# Patient Record
Sex: MALE | Race: BLACK OR AFRICAN AMERICAN | Employment: FULL TIME | URBAN - METROPOLITAN AREA
[De-identification: names, ages, dates, MRNs, and addresses within clinical notes are randomized per-mention and may not be internally consistent; named-entity substitution may affect disease eponyms.]

---

## 2017-02-16 ENCOUNTER — OFFICE VISIT (OUTPATIENT)
Dept: ORTHOPEDIC SURGERY | Age: 62
End: 2017-02-16

## 2017-02-16 DIAGNOSIS — M17.10 ARTHRITIS OF KNEE: Primary | ICD-10-CM

## 2017-02-16 DIAGNOSIS — M17.11 PRIMARY OSTEOARTHRITIS OF RIGHT KNEE: ICD-10-CM

## 2017-02-16 PROCEDURE — 99213 OFFICE O/P EST LOW 20 MIN: CPT | Performed by: ORTHOPAEDIC SURGERY

## 2017-02-16 PROCEDURE — 20610 DRAIN/INJ JOINT/BURSA W/O US: CPT | Performed by: ORTHOPAEDIC SURGERY

## 2017-06-08 ENCOUNTER — OFFICE VISIT (OUTPATIENT)
Dept: ORTHOPEDIC SURGERY | Age: 62
End: 2017-06-08

## 2017-06-08 VITALS — HEIGHT: 73 IN | BODY MASS INDEX: 37.51 KG/M2 | WEIGHT: 283 LBS

## 2017-06-08 DIAGNOSIS — M17.11 PRIMARY OSTEOARTHRITIS OF RIGHT KNEE: Primary | ICD-10-CM

## 2017-06-08 PROCEDURE — 99213 OFFICE O/P EST LOW 20 MIN: CPT | Performed by: ORTHOPAEDIC SURGERY

## 2017-06-08 RX ORDER — MELOXICAM 15 MG/1
15 TABLET ORAL DAILY
Qty: 30 TABLET | Refills: 3 | Status: SHIPPED | OUTPATIENT
Start: 2017-06-08

## 2017-06-19 ENCOUNTER — OFFICE VISIT (OUTPATIENT)
Dept: ORTHOPEDIC SURGERY | Age: 62
End: 2017-06-19

## 2017-06-19 VITALS — WEIGHT: 283 LBS | BODY MASS INDEX: 37.51 KG/M2 | HEIGHT: 73 IN

## 2017-06-19 DIAGNOSIS — M19.019 ACROMIOCLAVICULAR JOINT ARTHRITIS: ICD-10-CM

## 2017-06-19 DIAGNOSIS — M75.42 SUBACROMIAL IMPINGEMENT, LEFT: Primary | ICD-10-CM

## 2017-06-19 PROBLEM — M75.40 SUBACROMIAL IMPINGEMENT: Status: ACTIVE | Noted: 2017-06-19

## 2017-06-19 PROCEDURE — 73030 X-RAY EXAM OF SHOULDER: CPT | Performed by: ORTHOPAEDIC SURGERY

## 2017-06-19 PROCEDURE — 99214 OFFICE O/P EST MOD 30 MIN: CPT | Performed by: ORTHOPAEDIC SURGERY

## 2017-06-19 PROCEDURE — 20610 DRAIN/INJ JOINT/BURSA W/O US: CPT | Performed by: ORTHOPAEDIC SURGERY

## 2017-06-26 ENCOUNTER — OFFICE VISIT (OUTPATIENT)
Dept: ORTHOPEDIC SURGERY | Age: 62
End: 2017-06-26

## 2017-06-26 VITALS — HEIGHT: 73 IN | BODY MASS INDEX: 37.51 KG/M2 | WEIGHT: 283 LBS

## 2017-06-26 DIAGNOSIS — M75.42 SUBACROMIAL IMPINGEMENT, LEFT: Primary | ICD-10-CM

## 2017-06-26 DIAGNOSIS — M19.019 ACROMIOCLAVICULAR JOINT ARTHRITIS: ICD-10-CM

## 2017-06-26 PROCEDURE — 99214 OFFICE O/P EST MOD 30 MIN: CPT | Performed by: ORTHOPAEDIC SURGERY

## 2017-07-10 DIAGNOSIS — M75.42 SUBACROMIAL IMPINGEMENT, LEFT: Primary | ICD-10-CM

## 2017-07-10 DIAGNOSIS — M19.019 ACROMIOCLAVICULAR JOINT ARTHRITIS: ICD-10-CM

## 2017-07-10 RX ORDER — OXYCODONE HYDROCHLORIDE 10 MG/1
10 TABLET ORAL EVERY 8 HOURS PRN
Qty: 40 TABLET | Refills: 0 | Status: SHIPPED | OUTPATIENT
Start: 2017-07-10 | End: 2017-07-10 | Stop reason: CLARIF

## 2017-07-10 RX ORDER — OXYCODONE HYDROCHLORIDE 10 MG/1
10 TABLET ORAL EVERY 8 HOURS PRN
Qty: 40 TABLET | Refills: 0 | Status: SHIPPED | OUTPATIENT
Start: 2017-07-10 | End: 2017-07-17

## 2017-07-10 RX ORDER — MELOXICAM 15 MG/1
15 TABLET ORAL DAILY
Qty: 30 TABLET | Refills: 3 | Status: SHIPPED | OUTPATIENT
Start: 2017-07-10

## 2017-07-14 ENCOUNTER — HOSPITAL ENCOUNTER (OUTPATIENT)
Dept: SURGERY | Age: 62
Discharge: HOME | End: 2017-07-14
Attending: ORTHOPAEDIC SURGERY | Admitting: ORTHOPAEDIC SURGERY

## 2017-07-14 VITALS
HEIGHT: 73 IN | WEIGHT: 285.5 LBS | BODY MASS INDEX: 37.84 KG/M2 | RESPIRATION RATE: 17 BRPM | HEART RATE: 68 BPM | TEMPERATURE: 97.2 F | OXYGEN SATURATION: 94 % | SYSTOLIC BLOOD PRESSURE: 152 MMHG | DIASTOLIC BLOOD PRESSURE: 75 MMHG

## 2017-07-14 LAB
ANION GAP SERPL CALCULATED.3IONS-SCNC: 11 MMOL/L (ref 3–16)
BUN BLDV-MCNC: 12 MG/DL (ref 7–20)
CALCIUM SERPL-MCNC: 9.2 MG/DL (ref 8.3–10.6)
CHLORIDE BLD-SCNC: 108 MMOL/L (ref 99–110)
CO2: 27 MMOL/L (ref 21–32)
CREAT SERPL-MCNC: 1.1 MG/DL (ref 0.8–1.3)
GFR AFRICAN AMERICAN: >60
GFR NON-AFRICAN AMERICAN: >60
GLUCOSE BLD-MCNC: 130 MG/DL (ref 70–99)
GLUCOSE BLD-MCNC: 99 MG/DL (ref 70–99)
HCT VFR BLD CALC: 44 % (ref 40.5–52.5)
HEMOGLOBIN: 14.4 G/DL (ref 13.5–17.5)
MCH RBC QN AUTO: 27.2 PG (ref 26–34)
MCHC RBC AUTO-ENTMCNC: 32.8 G/DL (ref 31–36)
MCV RBC AUTO: 83 FL (ref 80–100)
PDW BLD-RTO: 15.3 % (ref 12.4–15.4)
PERFORMED ON: ABNORMAL
PLATELET # BLD: 167 K/UL (ref 135–450)
PMV BLD AUTO: 9.6 FL (ref 5–10.5)
POTASSIUM SERPL-SCNC: 3.7 MMOL/L (ref 3.5–5.1)
RBC # BLD: 5.3 M/UL (ref 4.2–5.9)
SODIUM BLD-SCNC: 146 MMOL/L (ref 136–145)
WBC # BLD: 4.7 K/UL (ref 4–11)

## 2017-07-14 RX ORDER — ONDANSETRON 2 MG/ML
4 INJECTION INTRAMUSCULAR; INTRAVENOUS
Status: ACTIVE | OUTPATIENT
Start: 2017-07-14 | End: 2017-07-14

## 2017-07-14 RX ORDER — MEPERIDINE HYDROCHLORIDE 25 MG/ML
12.5 INJECTION INTRAMUSCULAR; INTRAVENOUS; SUBCUTANEOUS EVERY 5 MIN PRN
Status: DISCONTINUED | OUTPATIENT
Start: 2017-07-14 | End: 2017-07-15 | Stop reason: HOSPADM

## 2017-07-14 RX ORDER — MORPHINE SULFATE 2 MG/ML
1 INJECTION, SOLUTION INTRAMUSCULAR; INTRAVENOUS EVERY 5 MIN PRN
Status: DISCONTINUED | OUTPATIENT
Start: 2017-07-14 | End: 2017-07-15 | Stop reason: HOSPADM

## 2017-07-14 RX ORDER — ACETAMINOPHEN 325 MG/1
650 TABLET ORAL EVERY 4 HOURS PRN
Status: DISCONTINUED | OUTPATIENT
Start: 2017-07-14 | End: 2017-07-15 | Stop reason: HOSPADM

## 2017-07-14 RX ORDER — OXYCODONE HYDROCHLORIDE AND ACETAMINOPHEN 5; 325 MG/1; MG/1
2 TABLET ORAL PRN
Status: COMPLETED | OUTPATIENT
Start: 2017-07-14 | End: 2017-07-14

## 2017-07-14 RX ORDER — LABETALOL HYDROCHLORIDE 5 MG/ML
5 INJECTION, SOLUTION INTRAVENOUS EVERY 10 MIN PRN
Status: DISCONTINUED | OUTPATIENT
Start: 2017-07-14 | End: 2017-07-15 | Stop reason: HOSPADM

## 2017-07-14 RX ORDER — OXYCODONE HYDROCHLORIDE AND ACETAMINOPHEN 5; 325 MG/1; MG/1
TABLET ORAL
Status: DISCONTINUED
Start: 2017-07-14 | End: 2017-07-15 | Stop reason: HOSPADM

## 2017-07-14 RX ORDER — DIPHENHYDRAMINE HYDROCHLORIDE 50 MG/ML
12.5 INJECTION INTRAMUSCULAR; INTRAVENOUS
Status: ACTIVE | OUTPATIENT
Start: 2017-07-14 | End: 2017-07-14

## 2017-07-14 RX ORDER — FENTANYL CITRATE 50 UG/ML
25 INJECTION, SOLUTION INTRAMUSCULAR; INTRAVENOUS EVERY 5 MIN PRN
Status: DISCONTINUED | OUTPATIENT
Start: 2017-07-14 | End: 2017-07-15 | Stop reason: HOSPADM

## 2017-07-14 RX ORDER — SODIUM CHLORIDE 9 MG/ML
INJECTION, SOLUTION INTRAVENOUS CONTINUOUS
Status: DISCONTINUED | OUTPATIENT
Start: 2017-07-14 | End: 2017-07-15 | Stop reason: HOSPADM

## 2017-07-14 RX ORDER — OXYCODONE HYDROCHLORIDE AND ACETAMINOPHEN 5; 325 MG/1; MG/1
1 TABLET ORAL PRN
Status: COMPLETED | OUTPATIENT
Start: 2017-07-14 | End: 2017-07-14

## 2017-07-14 RX ORDER — HYDROMORPHONE HCL 110MG/55ML
0.5 PATIENT CONTROLLED ANALGESIA SYRINGE INTRAVENOUS EVERY 5 MIN PRN
Status: DISCONTINUED | OUTPATIENT
Start: 2017-07-14 | End: 2017-07-15 | Stop reason: HOSPADM

## 2017-07-14 RX ADMIN — OXYCODONE HYDROCHLORIDE AND ACETAMINOPHEN 1 TABLET: 5; 325 TABLET ORAL at 16:24

## 2017-07-14 RX ADMIN — SODIUM CHLORIDE: 9 INJECTION, SOLUTION INTRAVENOUS at 13:17

## 2017-07-14 ASSESSMENT — PAIN SCALES - GENERAL
PAINLEVEL_OUTOF10: 6
PAINLEVEL_OUTOF10: 4
PAINLEVEL_OUTOF10: 5

## 2017-07-14 ASSESSMENT — PAIN - FUNCTIONAL ASSESSMENT: PAIN_FUNCTIONAL_ASSESSMENT: 0-10

## 2017-07-19 ENCOUNTER — TELEPHONE (OUTPATIENT)
Dept: ORTHOPEDIC SURGERY | Age: 62
End: 2017-07-19

## 2017-07-20 ENCOUNTER — OFFICE VISIT (OUTPATIENT)
Dept: ORTHOPEDIC SURGERY | Age: 62
End: 2017-07-20

## 2017-07-20 DIAGNOSIS — M25.512 ACUTE PAIN OF LEFT SHOULDER: Primary | ICD-10-CM

## 2017-07-20 PROCEDURE — 99024 POSTOP FOLLOW-UP VISIT: CPT | Performed by: ORTHOPAEDIC SURGERY

## 2017-07-24 ENCOUNTER — HOSPITAL ENCOUNTER (OUTPATIENT)
Dept: PHYSICAL THERAPY | Age: 62
Discharge: HOME | End: 2017-07-31
Admitting: ORTHOPAEDIC SURGERY

## 2017-07-31 ENCOUNTER — HOSPITAL ENCOUNTER (OUTPATIENT)
Dept: PHYSICAL THERAPY | Age: 62
Discharge: HOME | End: 2017-07-31
Admitting: ORTHOPAEDIC SURGERY

## 2017-08-08 ENCOUNTER — HOSPITAL ENCOUNTER (OUTPATIENT)
Dept: PHYSICAL THERAPY | Age: 62
Discharge: HOME | End: 2017-08-08
Admitting: ORTHOPAEDIC SURGERY

## 2017-08-10 DIAGNOSIS — M17.11 PRIMARY OSTEOARTHRITIS OF RIGHT KNEE: Primary | ICD-10-CM

## 2017-08-15 ENCOUNTER — HOSPITAL ENCOUNTER (OUTPATIENT)
Dept: PHYSICAL THERAPY | Age: 62
Discharge: HOME | End: 2017-08-15
Admitting: ORTHOPAEDIC SURGERY

## 2017-08-15 DIAGNOSIS — R52 PAIN: Primary | ICD-10-CM

## 2017-08-15 PROCEDURE — MISC903 COLD PAC CERVICAL: Performed by: ORTHOPAEDIC SURGERY

## 2017-08-16 ENCOUNTER — TELEPHONE (OUTPATIENT)
Dept: ORTHOPEDIC SURGERY | Age: 62
End: 2017-08-16

## 2017-08-17 ENCOUNTER — OFFICE VISIT (OUTPATIENT)
Dept: ORTHOPEDIC SURGERY | Age: 62
End: 2017-08-17

## 2017-08-17 DIAGNOSIS — M17.11 PRIMARY OSTEOARTHRITIS OF RIGHT KNEE: Primary | ICD-10-CM

## 2017-08-17 PROCEDURE — 99213 OFFICE O/P EST LOW 20 MIN: CPT | Performed by: ORTHOPAEDIC SURGERY

## 2017-08-17 PROCEDURE — 20610 DRAIN/INJ JOINT/BURSA W/O US: CPT | Performed by: ORTHOPAEDIC SURGERY

## 2017-08-22 ENCOUNTER — HOSPITAL ENCOUNTER (OUTPATIENT)
Dept: PHYSICAL THERAPY | Age: 62
Discharge: HOME | End: 2017-08-22
Admitting: ORTHOPAEDIC SURGERY

## 2017-08-29 ENCOUNTER — HOSPITAL ENCOUNTER (OUTPATIENT)
Dept: PHYSICAL THERAPY | Age: 62
Discharge: HOME | End: 2017-08-29
Admitting: ORTHOPAEDIC SURGERY

## 2017-09-07 ENCOUNTER — OFFICE VISIT (OUTPATIENT)
Dept: ORTHOPEDIC SURGERY | Age: 62
End: 2017-09-07

## 2017-09-07 ENCOUNTER — HOSPITAL ENCOUNTER (OUTPATIENT)
Dept: PHYSICAL THERAPY | Age: 62
Discharge: HOME | End: 2017-09-07
Admitting: ORTHOPAEDIC SURGERY

## 2017-09-07 DIAGNOSIS — M75.42 SUBACROMIAL IMPINGEMENT, LEFT: Primary | ICD-10-CM

## 2017-09-07 DIAGNOSIS — M19.019 ACROMIOCLAVICULAR JOINT ARTHRITIS: ICD-10-CM

## 2017-09-07 PROCEDURE — 99024 POSTOP FOLLOW-UP VISIT: CPT | Performed by: ORTHOPAEDIC SURGERY

## 2017-09-12 ENCOUNTER — HOSPITAL ENCOUNTER (OUTPATIENT)
Dept: PHYSICAL THERAPY | Age: 62
Discharge: HOME | End: 2017-09-12
Admitting: ORTHOPAEDIC SURGERY

## 2017-09-12 ENCOUNTER — TELEPHONE (OUTPATIENT)
Dept: ORTHOPEDIC SURGERY | Age: 62
End: 2017-09-12

## 2017-09-14 ENCOUNTER — TELEPHONE (OUTPATIENT)
Dept: ORTHOPEDIC SURGERY | Age: 62
End: 2017-09-14

## 2017-09-19 ENCOUNTER — HOSPITAL ENCOUNTER (OUTPATIENT)
Dept: PHYSICAL THERAPY | Age: 62
Discharge: HOME | End: 2017-09-19
Admitting: ORTHOPAEDIC SURGERY

## 2017-09-26 ENCOUNTER — HOSPITAL ENCOUNTER (OUTPATIENT)
Dept: PHYSICAL THERAPY | Age: 62
Discharge: HOME | End: 2017-09-26
Admitting: ORTHOPAEDIC SURGERY

## 2017-10-03 ENCOUNTER — HOSPITAL ENCOUNTER (OUTPATIENT)
Dept: PHYSICAL THERAPY | Age: 62
Discharge: HOME | End: 2017-10-03
Admitting: ORTHOPAEDIC SURGERY

## 2017-10-03 NOTE — FLOWSHEET NOTE
[address], [facility]    Physical Therapy Daily Treatment Note  Date:  10/3/2017    Patient Name:  Elisa Dugan    :  1955  MRN: [id number]  Restrictions/Precautions:    Medical/Treatment Diagnosis Information:  · Diagnosis: M25.512 (Left shoulder pain)   · Treatment Diagnosis: M25.512 (Left shoulder pain)   Insurance/Certification information:  PT Insurance Information:   Physician Information:  Referring Practitioner: Dr. Dudley Acuña of care signed (Y/N):     Date of Patient follow up with Physician:  Emelyn Glez 10/12    G-Code (if applicable):      Date G-Code Applied:         Progress Note: [x]  Yes  []  No  Next due by: Visit #10      Latex Allergy:  [x]NO      []YES  Preferred Language for Healthcare:   [x]English       []other:    Visit # Insurance Allowable   11  10/3 20     Pain level:  2/10 at rest, 5/10 w/ IR towel stretch     SUBJECTIVE:  8 + weeks s/p  Sore from moving furniture yesterday, noticed some soreness after   OBJECTIVE:  Observation:  Test measurements:    17  PROM Flex  ~165  ABD ~170  17  Prom with increased stiffness with Abduction and IR    RESTRICTIONS/PRECAUTIONS: L SAD, DCE & debridement 17    Exercises/Interventions:   Therapeutic Ex  40' Wt / Resistance Sets/sec Reps Notes                  Pulley shoulder AAROM  5 mins  Cane AAROM flex/abduction/ER    [employer]            T- band Row/pinch Blue   x30    T-band high row blue   x30    T-band extension  blue  x30    T-band IR/ER Green  x30    T- band lower pinch       T- band ER activation       Supine SA punch 6#  x30    SL ER  SL ABD  5#  3# 3  2 10    Prone Rows/EXT  HABD 6#  6# 3  3 10    Wall Slides Seated HH  Depression       No Money  green 1 25    Standing flex/scap   NPV    Scapular ball rolls IR towel    Sleeper IR   30\" 3xea       LLLD ER hammock/ Doorway 30\" 3x HEP @ home                  Forward diagonal stretch  10 sec x3 HEP   Upper trap stretch  10 sec x3 HEP     Manual Intervention  8'       Shld /GH Mobs  8 min     Post Cap mobs       Thoracic/Rib manipualtion       CT MT/Mobs       PROM MT                     NMR re-education 8'       T-spine Ext       GH depres/compress       Liz Scap [employer] stick  3#Flex  3x 15\"    Body blade ER/IR & flex Black  3x 30\"  Flex yellow   Supine flex/ 90/90 ER/IR RED 2  10ea     Wall Ball bounce       D2 FLEX YELLOW 2 10    Wall pushup   x30        Therapeutic Exercise and NMR EXR  [x] (85714) Provided verbal/tactile cueing for activities related to strengthening, flexibility, endurance, ROM  for improvements in scapular, scapulothoracic and UE control with self care, reaching, carrying, lifting, house/yardwork, driving/computer work.    [] (75727) Provided verbal/tactile cueing for activities related to improving balance, coordination, kinesthetic sense, posture, motor skill, proprioception  to assist with  scapular, scapulothoracic and UE control with self care, reaching, carrying, lifting, house/yardwork, driving/computer work. Therapeutic Activities:    [] (10883 or 79747) Provided verbal/tactile cueing for activities related to improving balance, coordination, kinesthetic sense, posture, motor skill, proprioception and motor activation to allow for proper function of scapular, scapulothoracic and UE control with self care, carrying, lifting, driving/computer work.      Home Exercise Program:    [x] (71867) Reviewed/Progressed HEP activities related to strengthening, flexibility, endurance, ROM of scapular, scapulothoracic and UE control with self care, reaching, carrying, lifting, house/yardwork, driving/computer work  [] (58580) Reviewed/Progressed HEP activities related to improving balance, coordination, kinesthetic sense, posture, motor skill, proprioception of scapular, scapulothoracic and UE control with self care, reaching, carrying, lifting, house/yardwork, driving/computer work pain  [] Progression has been slowed due to co-morbidities. [] Plan just implemented, too soon to assess goals progression  [] Other:     ASSESSMENT:    Pt continues to improve RTC/UE strength with program exercises. Pt has a tendency to overuse his upper traps due to RTC weakness, but found less compensation today. Treatment/Activity Tolerance:  [x] Patient tolerated treatment well [x] Patient limited by fatique  [] Patient limited by pain  [] Patient limited by other medical complications  [] Other:     Prognosis: [x] Good [] Fair  [] Poor    Patient Requires Follow-up: [x] Yes  [] No    PLAN:  Progress as able towards overhead strength, improve end range IR ROM with left shoulder. Progress endurance as able and focus on OH strengthening Millan 10/12.   [x] Continue per plan of care [] Alter current plan (see comments)  [] Plan of care initiated [] Hold pending MD visit [] Discharge    Electronically signed by: Sergio Martinez PTA, [id number]

## 2017-10-10 ENCOUNTER — HOSPITAL ENCOUNTER (OUTPATIENT)
Dept: PHYSICAL THERAPY | Age: 62
Discharge: HOME | End: 2017-10-10
Admitting: ORTHOPAEDIC SURGERY

## 2017-10-10 NOTE — FLOWSHEET NOTE
[address] , [facility]    Physical Therapy Daily Treatment Note  Date:  10/10/2017    Patient Name:  Leti Ramsey    :  1955  MRN: [id number]  Restrictions/Precautions:    Medical/Treatment Diagnosis Information:  · Diagnosis: M25.512 (Left shoulder pain)   · Treatment Diagnosis: M25.512 (Left shoulder pain)   Insurance/Certification information:  PT Insurance Information:   Physician Information:  Referring Practitioner: Dr. Tejada Ee of care signed (Y/N):     Date of Patient follow up with Physician:  Dave Larsen 10/12    G-Code (if applicable):      Date G-Code Applied:         Progress Note: [x]  Yes  []  No  Next due by: Visit #10      Latex Allergy:  [x]NO      []YES  Preferred Language for Healthcare:   [x]English       []other:    Visit # Insurance Allowable   11  10/3 20     Pain level:  2/10 at rest, 5/10 w/ IR towel stretch     SUBJECTIVE:  8 + weeks s/p   Pt reports having an episode on  (while going down the stairs) where he had to grab the stairs case rapidly causing a dramatic increase in pain/ soreness since. Is trying to ice more and has been taking alleve. OBJECTIVE:  Observation:  Test measurements:    17  PROM Flex  ~165  ABD ~170  17  Prom with increased stiffness with Abduction and IR  10/10/17  TTP over the clavicle and slightly over the lateral shoulder, MMT ER demonstrated good RTC strength 4/5. PROM good ROM but had pain in all end ranges and especially attempting to perform IR ROM.      RESTRICTIONS/PRECAUTIONS: L SAD, DCE & debridement 17    Exercises/Interventions: program modified today due to increased shoulder pain  Therapeutic Ex  20' Wt / Resistance Sets/sec Reps Notes                  Pulley shoulder AAROM  5 mins  Cane AAROM flex/abduction/ER    [employer]            T- band Row/pinch  RED   x30    T-band high row  x30    T-band extension   x30    T-band IR/ER RED x30    T- band lower

## 2017-10-12 ENCOUNTER — OFFICE VISIT (OUTPATIENT)
Dept: ORTHOPEDIC SURGERY | Age: 62
End: 2017-10-12

## 2017-10-12 VITALS — WEIGHT: 285 LBS | HEIGHT: 73 IN | BODY MASS INDEX: 37.77 KG/M2

## 2017-10-12 DIAGNOSIS — M19.019 ACROMIOCLAVICULAR JOINT ARTHRITIS: Primary | ICD-10-CM

## 2017-10-12 DIAGNOSIS — M75.42 SUBACROMIAL IMPINGEMENT, LEFT: ICD-10-CM

## 2017-10-12 PROCEDURE — 99024 POSTOP FOLLOW-UP VISIT: CPT | Performed by: ORTHOPAEDIC SURGERY

## 2017-10-12 NOTE — LETTER
[facility]  [address] [facility] [address]  Phone: [phone]  Fax: [phone] [facility],[address] [place], DO        October 12, 2017     Patient: Shanice Shukla   YOB: 1955   Date of Visit: 10/12/2017       To Whom It May Concern: It is my medical opinion that Glendy Medina should remain out of work until 11/7/17. If you have any questions or concerns, please don't hesitate to call.     Sincerely,         Sri Xiong DO

## 2017-10-17 ENCOUNTER — HOSPITAL ENCOUNTER (OUTPATIENT)
Dept: PHYSICAL THERAPY | Age: 62
Discharge: HOME | End: 2017-10-17
Admitting: ORTHOPAEDIC SURGERY

## 2017-10-17 NOTE — FLOWSHEET NOTE
lifting, house/yardwork, driving/computer work  [] (83828) Reviewed/Progressed HEP activities related to improving balance, coordination, kinesthetic sense, posture, motor skill, proprioception of scapular, scapulothoracic and UE control with self care, reaching, carrying, lifting, house/yardwork, driving/computer work      Manual Treatments:  PROM / STM / Oscillations-Mobs:  G-I, II, III, IV (PA's, Inf., Post.)  [x] (61992) Provided manual therapy to mobilize soft tissue/joints of cervical/CT, scapular GHJ and UE for the purpose of modulating pain, promoting relaxation,  increasing ROM, reducing/eliminating soft tissue swelling/inflammation/restriction, improving soft tissue extensibility and allowing for proper ROM for normal function with self care, reaching, carrying, lifting, house/yardwork, driving/computer work    Modalities:  Ice/ ES  x15    Charges:  Timed Code Treatment Minutes: 40 mins   Total Treatment Minutes: 55  mins     [] EVAL  [x] UA(65470) x  2   [] IONTO  [] NMR (77376) x      [] VASO  [x] Manual (25051) x  1    [] Other:  [] TA x       [] Mech Traction (64284)  [] ES(attended) (95668)      [x] ES (un) (92527):     GOALS:  Patient stated goal: \"return to work\"     Therapist goals for Patient:   Short Term Goals: To be achieved in: 2 weeks  1. Independent in HEP and progression per patient tolerance, in order to prevent re-injury. 2. Patient will have a decrease in pain to facilitate improvement in movement, function, and ADLs as indicated by Functional Deficits. Long Term Goals: To be achieved in: 8 weeks  1. Disability index score of 20% or less for the DASH to assist with reaching prior level of function. 2. Patient will demonstrate increased left shoulder AROM to WNL to allow for proper joint functioning as indicated by patients Functional Deficits.    3. Patient will demonstrate an increase in Left shoulder strength to 5/5 to allow for proper functional mobility as indicated by patients

## 2017-10-18 ENCOUNTER — TELEPHONE (OUTPATIENT)
Dept: ORTHOPEDIC SURGERY | Age: 62
End: 2017-10-18

## 2017-10-18 NOTE — TELEPHONE ENCOUNTER
E-mailed a medical questionnaire from [facility] to [facility]) for completion.

## 2017-10-24 ENCOUNTER — HOSPITAL ENCOUNTER (OUTPATIENT)
Dept: PHYSICAL THERAPY | Age: 62
Discharge: HOME | End: 2017-10-24
Admitting: ORTHOPAEDIC SURGERY

## 2017-10-24 NOTE — FLOWSHEET NOTE
[address], [facility]    Physical Therapy Daily Treatment Note  Date:  10/24/2017    Patient Name:  Kolton Kirby    :  1955  MRN: [id number]  Restrictions/Precautions:    Medical/Treatment Diagnosis Information:  · Diagnosis: M25.512 (Left shoulder pain)   · Treatment Diagnosis: M25.512 (Left shoulder pain)   Insurance/Certification information:  PT Insurance Information:   Physician Information:  Referring Practitioner: Dr. Urvashi Roberts of care signed (Y/N):     Date of Patient follow up with Physician:  Janelle Martinez 10/12    G-Code (if applicable):      Date G-Code Applied:         Progress Note: [x]  Yes  []  No  Next due by: Visit #10      Latex Allergy:  [x]NO      []YES  Preferred Language for Healthcare:   [x]English       []other:    Visit # Insurance Allowable   11  10/17 20     Pain level:  4/10 at rest, 5/10 w/ IR towel stretch     SUBJECTIVE:  8 + weeks s/p   Shoulder feeling much better overall and is having less stiffness. Minimal pain over the weekend and was surprised not to have more discomfort related to the weather. OBJECTIVE:  Observation:  Test measurements:    17  PROM Flex  ~165  ABD ~170  17  Prom with increased stiffness with Abduction and IR  10/10/17  TTP over the clavicle and slightly over the lateral shoulder, MMT ER demonstrated good RTC strength 4/5. PROM good ROM but had pain in all end ranges and especially attempting to perform IR ROM.      RESTRICTIONS/PRECAUTIONS: L SAD, DCE & debridement 17    Exercises/Interventions:   Therapeutic Ex  30' Wt / Resistance Sets/sec Reps Notes                  Pulley shoulder AAROM  5 mins  Cane AAROM flex/abduction/ER    [employer]            T- band Row/pinch  RED   x30    T-band high row Green   x30    T-band extension  Green  x30    T-band IR  ER Blue  Green x30    T- band lower pinch       T- band ER activation       Supine SA punch 3#  x30    SL ER  SL ABD Reviewed/Progressed HEP activities related to improving balance, coordination, kinesthetic sense, posture, motor skill, proprioception of scapular, scapulothoracic and UE control with self care, reaching, carrying, lifting, house/yardwork, driving/computer work      Manual Treatments:  PROM / STM / Oscillations-Mobs:  G-I, II, III, IV (PA's, Inf., Post.)  [x] (86499) Provided manual therapy to mobilize soft tissue/joints of cervical/CT, scapular GHJ and UE for the purpose of modulating pain, promoting relaxation,  increasing ROM, reducing/eliminating soft tissue swelling/inflammation/restriction, improving soft tissue extensibility and allowing for proper ROM for normal function with self care, reaching, carrying, lifting, house/yardwork, driving/computer work    Modalities:  Ice/ ES  x15    Charges:  Timed Code Treatment Minutes: 40 mins   Total Treatment Minutes: 55  mins     [] EVAL  [x] DN(46049) x  2   [] IONTO  [] NMR (09545) x      [] VASO  [x] Manual (21633) x  1    [] Other:  [] TA x       [] Mech Traction (30782)  [] ES(attended) (44836)      [x] ES (un) (60261):     GOALS:  Patient stated goal: \"return to work\"     Therapist goals for Patient:   Short Term Goals: To be achieved in: 2 weeks  1. Independent in HEP and progression per patient tolerance, in order to prevent re-injury. 2. Patient will have a decrease in pain to facilitate improvement in movement, function, and ADLs as indicated by Functional Deficits. Long Term Goals: To be achieved in: 8 weeks  1. Disability index score of 20% or less for the DASH to assist with reaching prior level of function. 2. Patient will demonstrate increased left shoulder AROM to WNL to allow for proper joint functioning as indicated by patients Functional Deficits. 3. Patient will demonstrate an increase in Left shoulder strength to 5/5 to allow for proper functional mobility as indicated by patients Functional Deficits.    4. Patient will return to all

## 2017-10-31 ENCOUNTER — HOSPITAL ENCOUNTER (OUTPATIENT)
Dept: PHYSICAL THERAPY | Age: 62
Discharge: HOME | End: 2017-10-31
Admitting: ORTHOPAEDIC SURGERY

## 2017-10-31 NOTE — FLOWSHEET NOTE
[address], [facility]    Physical Therapy Daily Treatment Note  Date:  10/31/2017    Patient Name:  Mario Naranjo    :  1955  MRN: [id number]  Restrictions/Precautions:    Medical/Treatment Diagnosis Information:  · Diagnosis: M25.512 (Left shoulder pain)   · Treatment Diagnosis: M25.512 (Left shoulder pain)   Insurance/Certification information:  PT Insurance Information:   Physician Information:  Referring Practitioner: Dr. Britney Man of care signed (Y/N):     Date of Patient follow up with Physician:  Nicole Bowie 10/12    G-Code (if applicable):      Date G-Code Applied:         Progress Note: [x]  Yes  []  No  Next due by: Visit #10      Latex Allergy:  [x]NO      []YES  Preferred Language for Healthcare:   [x]English       []other:    Visit # Insurance Allowable   11  10/17 20     Pain level:  4/10 at rest, 5/10 w/ IR towel stretch     SUBJECTIVE:  8 + weeks s/p    Shoulder feels pretty good today, denies any pain. OBJECTIVE:  Observation:  Test measurements:    17  PROM Flex  ~165  ABD ~170  17  Prom with increased stiffness with Abduction and IR  10/10/17  TTP over the clavicle and slightly over the lateral shoulder, MMT ER demonstrated good RTC strength 4/5. PROM good ROM but had pain in all end ranges and especially attempting to perform IR ROM.      RESTRICTIONS/PRECAUTIONS: L SAD, DCE & debridement 17    Exercises/Interventions:   Therapeutic Ex  35' Wt / Resistance Sets/sec Reps Notes                  Pulley shoulder AAROM  5 mins  Cane AAROM flex/abduction/ER    [employer] Rave press 5# 3 10            T- band Row/pinch  RED   x30    T-band high row Green   x30    T-band extension  Green  x30    T-band IR  ER Blue  Green x30    T- band lower pinch       T- band ER activation       Supine SA punch 4#  x30    SL ER  SL ABD  3#  3# 3   10    Prone Rows/EXT  HABD    4#  2# 3   10    Wall Slides Flex  10 10 Seated HH  Depression kinesthetic sense, posture, motor skill, proprioception of scapular, scapulothoracic and UE control with self care, reaching, carrying, lifting, house/yardwork, driving/computer work      Manual Treatments:  PROM / STM / Oscillations-Mobs:  G-I, II, III, IV (PA's, Inf., Post.)  [x] (01517) Provided manual therapy to mobilize soft tissue/joints of cervical/CT, scapular GHJ and UE for the purpose of modulating pain, promoting relaxation,  increasing ROM, reducing/eliminating soft tissue swelling/inflammation/restriction, improving soft tissue extensibility and allowing for proper ROM for normal function with self care, reaching, carrying, lifting, house/yardwork, driving/computer work    Modalities:  Ice/ ES  x15    Charges:  Timed Code Treatment Minutes: 53 mins   Total Treatment Minutes: 68  mins     [] EVAL  [x] BF(44269) x  2   [] IONTO  [x] NMR (79300) x  1   [] VASO  [x] Manual (12276) x  1    [] Other:  [] TA x       [] Mech Traction (77277)  [] ES(attended) (38373)      [] ES (un) (27871):     GOALS:  Patient stated goal: \"return to work\"     Therapist goals for Patient:   Short Term Goals: To be achieved in: 2 weeks  1. Independent in HEP and progression per patient tolerance, in order to prevent re-injury. 2. Patient will have a decrease in pain to facilitate improvement in movement, function, and ADLs as indicated by Functional Deficits. Long Term Goals: To be achieved in: 8 weeks  1. Disability index score of 20% or less for the DASH to assist with reaching prior level of function. 2. Patient will demonstrate increased left shoulder AROM to WNL to allow for proper joint functioning as indicated by patients Functional Deficits. 3. Patient will demonstrate an increase in Left shoulder strength to 5/5 to allow for proper functional mobility as indicated by patients Functional Deficits. 4. Patient will return to all functional/work related activities without increased symptoms or restriction.    5. Pt will lift >5lbs without difficulty. Progression Towards Functional goals:  [x] Patient is progressing as expected towards functional goals listed. [] Progression is slowed due to pain  [] Progression has been slowed due to co-morbidities. [] Plan just implemented, too soon to assess goals progression  [] Other:     ASSESSMENT:        Treatment/Activity Tolerance:  [x] Patient tolerated treatment well [x] Patient limited by fatique  [] Patient limited by pain  [] Patient limited by other medical complications  [x] Other: denied pain today with program content but fatigued     Prognosis: [x] Good [] Fair  [] Poor    Patient Requires Follow-up: [x] Yes  [] No    PLAN:  Continue to ice shoulder and monitor OH activities until shoulder symptoms decrease.   [x] Continue per plan of care [] Alter current plan (see comments)  [] Plan of care initiated [] Hold pending MD visit [] Discharge    Electronically signed by: Rubi Hutton [facility], [id number]

## 2017-11-01 ENCOUNTER — HOSPITAL ENCOUNTER (OUTPATIENT)
Dept: PHYSICAL THERAPY | Age: 62
Discharge: HOME | End: 2017-11-30
Attending: ORTHOPAEDIC SURGERY | Admitting: ORTHOPAEDIC SURGERY

## 2017-11-02 ENCOUNTER — OFFICE VISIT (OUTPATIENT)
Dept: ORTHOPEDIC SURGERY | Age: 62
End: 2017-11-02

## 2017-11-02 DIAGNOSIS — M75.42 SUBACROMIAL IMPINGEMENT, LEFT: ICD-10-CM

## 2017-11-02 DIAGNOSIS — M19.019 ACROMIOCLAVICULAR JOINT ARTHRITIS: Primary | ICD-10-CM

## 2017-11-02 PROCEDURE — 99213 OFFICE O/P EST LOW 20 MIN: CPT | Performed by: ORTHOPAEDIC SURGERY

## 2017-11-02 NOTE — LETTER
[facility]  [address] [address]  Phone: [phone]  Fax: [phone] [facility],[address] DO Shawna        November 2, 2017     Patient: Braxton Sotelo   YOB: 1955   Date of Visit: 11/2/2017       To Whom It May Concern: It is my medical opinion that Gary Santana should remain out of work until 01/01/2018. If you have any questions or concerns, please don't hesitate to call.     Sincerely,         Sharon Gama DO

## 2017-11-02 NOTE — PROGRESS NOTES
internal rotation against resistance external rotation against resistance supraspinatus isolation against resistance. Shoulder shrug strength is 5 over 5 equal bilaterally. Radial ulnar and median nerve function is intact. Capillary refill is brisk. Elbow motion finger and wrist motion is full equal bilaterally. Deep tendon reflexes of the Brachial radialis, biceps, tricepsAre all +2/4 equal bilaterally. Cervical spine range of motion is full without pain negative Spurling's test.  Load-and-shift test is negative. Crank test is negative. Apprehension and relocation is negative. Anterior and posterior glide are equal bilaterally. Negative sulcus sign. No signs of any significant multidirectional instability. There is no scapular winging. There is no muscle atrophy of the latissimus dorsi, the deltoid, the periscapular musculature,The trapezius musculature or the pectoralis musculature. Negative Neer's test, negative Cohn test, no pain with crossarm elevation. Abduction --150 degrees  Flexion-- 180 degrees  Extension-- between 45-60 degrees  Latera/external  rotation --close to 90 degrees  Medial/ internal rotation -- between 70-90 degree      Reflex: upper extremity: Deep tendon reflexes of the biceps, triceps, brachioradialis +2/4 equal bilaterally  Lower extremity: +2/4 and equal bilaterally for patella and Achilles    Additional Comments:       Cervical spine exam demonstrates no  Radiculopathy no reproduction of the symptomology. Range of motion is normal without pain or radiculopathy and does not cause shoulder pain. Additional Examinations:  Right Upper Extremity:  Examination of the right upper extremity does not show any tenderness, deformity or injury. Range of motion is unremarkable. There is no gross instability. There are no rashes, ulcerations or lesions.   Strength and tone are normal.  Thoracic Spine: Examination of the thoracic spine does not show any tenderness, deformity or injury. Range of motion is unremarkable. There is no gross instability. There are no rashes, ulcerations or lesions. Strength and tone are normal.  Neck: Examination of the neck does not show any tenderness, deformity or injury. Range of motion is unremarkable. There is no gross instability. There are no rashes, ulcerations or lesions. Strength and tone are normal.    Past Surgical History:   Procedure Laterality Date    CHOLECYSTECTOMY      HERNIA REPAIR      inguinal     KNEE ARTHROSCOPY Right 9/18/15    PARTIAL MEDIAL MENISCECTOMY,SYNOVECTOMY, CHONDROPLASTY    SHOULDER ARTHROSCOPY Left 07/14/2017    SUBACROMIAL DECOMPRESSION, DISTAL CLAVICLE EXCISION, LABRAL DEBRIDEMENT         Assessment:  Left shoulder postop subacromial decompression distal clavicle excision doing much better almost back to where he needs to be    Impression: Same    Office Procedures:  No orders of the defined types were placed in this encounter. Previous Treatments:  Shoulder arthroscopy subacromial decompression distal clavicle excision, physical therapy    Treatment Plan:  Continue work on strength and range of motion follow-up in 6 weeks at which point we should build to release him to all activities      [facility] [employer]  Board Certified  [employer] Team Physician

## 2017-11-08 ENCOUNTER — TELEPHONE (OUTPATIENT)
Dept: ORTHOPEDIC SURGERY | Age: 62
End: 2017-11-08

## 2017-11-08 ENCOUNTER — HOSPITAL ENCOUNTER (OUTPATIENT)
Dept: PHYSICAL THERAPY | Age: 62
Discharge: HOME | End: 2017-11-08
Admitting: ORTHOPAEDIC SURGERY

## 2017-11-08 NOTE — FLOWSHEET NOTE
[address], [facility]    Physical Therapy Daily Treatment Note  Date:  2017    Patient Name:  Tomasa Arcos    :  1955  MRN: [id number]  Restrictions/Precautions:    Medical/Treatment Diagnosis Information:  · Diagnosis: M25.512 (Left shoulder pain)   · Treatment Diagnosis: M25.512 (Left shoulder pain)   Insurance/Certification information:  PT Insurance Information:   Physician Information:  Referring Practitioner: Dr. Mia Ghosh of care signed (Y/N):     Date of Patient follow up with Physician:  Maranda Krabbe     G-Code (if applicable):      Date G-Code Applied:         Progress Note: [x]  Yes  []  No  Next due by: Visit #10      Latex Allergy:  [x]NO      []YES  Preferred Language for Healthcare:   [x]English       []other:    Visit # Insurance Allowable        Pain level:  4/10     SUBJECTIVE:  16 weeks s/p   Less collarbone soreness but has some discomfort. Millan pleased  With his progress but plans to keep him off work for another 6 weeks. OBJECTIVE:  Observation:  Test measurements:    17  PROM Flex  ~165  ABD ~170  17  Prom with increased stiffness with Abduction and IR  10/10/17  TTP over the clavicle and slightly over the lateral shoulder, MMT ER demonstrated good RTC strength 4/5. PROM good ROM but had pain in all end ranges and especially attempting to perform IR ROM.    17 end range stiffness with PROM abduction    RESTRICTIONS/PRECAUTIONS: L SAD, DCE & debridement 17    Exercises/Interventions:   Therapeutic Ex  35' Wt / Resistance Sets/sec Reps Notes                  Pulley shoulder AAROM  5 mins  Cane AAROM flex/abduction/ER    [employer] press 5# 3 10            T- band Row/pinch  RED   x30    T-band high row    T-band extension     T-band IR  ER   Green x30    T- band lower pinch       T- band ER activation       Supine SA punch 5#  x30    SL ER  SL ABD  3#  3# 3   10    Prone Rows/EXT  HABD 4#  3# 3   10    Wall Slides Seated HH  Depression       No Money Teal  1 25           Scapular ball rolls IR towel    Sleeper IR   30\" 3xea       LLLD ER hammock/ Doorway 3'               Forward diagonal stretch  HEP   Upper trap stretch  HEP     Manual Intervention  8'       Shld /GH Mobs  8 min     Post Cap mobs       Thoracic/Rib manipualtion       CT MT/Mobs       PROM MT                     NMR re-education  10'       Standing scaption  2# 2 10    GH depres/compress       Liz Scap Bio       Water stick  Resume NPV   Body blade ER/IR &   flex Black  yellow  3x 30\"  15\"  Flex yellow   Standing flex/ 90/90 ER/IR yellow 3  10ea   Resume NPV   Wall Ball bounce    D2 FLEX standing yellow  3 10    Wall pushup  x30        Therapeutic Exercise and NMR EXR  [x] (66231) Provided verbal/tactile cueing for activities related to strengthening, flexibility, endurance, ROM  for improvements in scapular, scapulothoracic and UE control with self care, reaching, carrying, lifting, house/yardwork, driving/computer work.    [] (87319) Provided verbal/tactile cueing for activities related to improving balance, coordination, kinesthetic sense, posture, motor skill, proprioception  to assist with  scapular, scapulothoracic and UE control with self care, reaching, carrying, lifting, house/yardwork, driving/computer work. Therapeutic Activities:    [] (90561 or 15552) Provided verbal/tactile cueing for activities related to improving balance, coordination, kinesthetic sense, posture, motor skill, proprioception and motor activation to allow for proper function of scapular, scapulothoracic and UE control with self care, carrying, lifting, driving/computer work.      Home Exercise Program:    [x] (16604) Reviewed/Progressed HEP activities related to strengthening, flexibility, endurance, ROM of scapular, scapulothoracic and UE control with self care, reaching, carrying, lifting, house/yardwork, driving/computer work  [] (92230) functional/work related activities without increased symptoms or restriction. 5. Pt will lift >5lbs without difficulty. Progression Towards Functional goals:  [x] Patient is progressing as expected towards functional goals listed. [] Progression is slowed due to pain  [] Progression has been slowed due to co-morbidities. [] Plan just implemented, too soon to assess goals progression  [] Other:     ASSESSMENT:        Treatment/Activity Tolerance:  [x] Patient tolerated treatment well [x] Patient limited by fatique  [] Patient limited by pain  [] Patient limited by other medical complications  [x] Other: denied pain today with program content but fatigued with OH progressions    Prognosis: [x] Good [] Fair  [] Poor    Patient Requires Follow-up: [x] Yes  [] No    PLAN:  Continue to ice shoulder and monitor activities around the house until stronger.  Progress strength as able  [x] Continue per plan of care [] Alter current plan (see comments)  [] Plan of care initiated [] Hold pending MD visit [] Discharge    Electronically signed by: Mauro Salas PTA, [id number]

## 2017-11-14 ENCOUNTER — HOSPITAL ENCOUNTER (OUTPATIENT)
Dept: PHYSICAL THERAPY | Age: 62
Discharge: HOME | End: 2017-11-14
Admitting: ORTHOPAEDIC SURGERY

## 2017-11-14 NOTE — FLOWSHEET NOTE
Rows/EXT  HABD    6# ROWS  3# 3   10    Wall Slides Seated HH  Depression       No Money Teal  1 25           Scapular ball rolls IR towel    Sleeper IR   30\" 3xea       LLLD ER hammock/ Doorway 3'               Forward diagonal stretch  HEP   Upper trap stretch  HEP     Manual Intervention  8'       Shld /GH Mobs  8 min     Post Cap mobs       Thoracic/Rib manipualtion       CT MT/Mobs       PROM MT                     NMR re-education  10'       Standing scaption  2# 2 10    GH depres/compress       Liz Scap Bio       Water stick  3# Flex  3x 15\"    Body blade ER/IR &   Flex  Black  Yellow        3x 30\"  3x 15\"  Flex yellow   Standing flex/ 90/90 ER/IR yellow 3  10ea      Wall Ball bounce    D2 FLEX standing yellow  3 10    Wall pushup  x30        Therapeutic Exercise and NMR EXR  [x] (69303) Provided verbal/tactile cueing for activities related to strengthening, flexibility, endurance, ROM  for improvements in scapular, scapulothoracic and UE control with self care, reaching, carrying, lifting, house/yardwork, driving/computer work.    [] (01252) Provided verbal/tactile cueing for activities related to improving balance, coordination, kinesthetic sense, posture, motor skill, proprioception  to assist with  scapular, scapulothoracic and UE control with self care, reaching, carrying, lifting, house/yardwork, driving/computer work. Therapeutic Activities:    [] (89728 or 27159) Provided verbal/tactile cueing for activities related to improving balance, coordination, kinesthetic sense, posture, motor skill, proprioception and motor activation to allow for proper function of scapular, scapulothoracic and UE control with self care, carrying, lifting, driving/computer work.      Home Exercise Program:    [x] (19117) Reviewed/Progressed HEP activities related to strengthening, flexibility, endurance, ROM of scapular, scapulothoracic and UE control with self care, reaching, carrying, lifting, house/yardwork, driving/computer work  [] (22179) Reviewed/Progressed HEP activities related to improving balance, coordination, kinesthetic sense, posture, motor skill, proprioception of scapular, scapulothoracic and UE control with self care, reaching, carrying, lifting, house/yardwork, driving/computer work      Manual Treatments:  PROM / STM / Oscillations-Mobs:  G-I, II, III, IV (PA's, Inf., Post.)  [x] (29628) Provided manual therapy to mobilize soft tissue/joints of cervical/CT, scapular GHJ and UE for the purpose of modulating pain, promoting relaxation,  increasing ROM, reducing/eliminating soft tissue swelling/inflammation/restriction, improving soft tissue extensibility and allowing for proper ROM for normal function with self care, reaching, carrying, lifting, house/yardwork, driving/computer work    Modalities:  Ice x15    Charges:  Timed Code Treatment Minutes: 53 mins   Total Treatment Minutes: 63  mins     [] EVAL  [x] FK(39992) x  2   [] IONTO  [x] NMR (60532) x  1   [] VASO  [x] Manual (42936) x  1    [] Other:  [] TA x       [] Mech Traction (62625)  [] ES(attended) (22113)      [] ES (un) (64859):     GOALS:  Patient stated goal: \"return to work\"     Therapist goals for Patient:   Short Term Goals: To be achieved in: 2 weeks  1. Independent in HEP and progression per patient tolerance, in order to prevent re-injury. 2. Patient will have a decrease in pain to facilitate improvement in movement, function, and ADLs as indicated by Functional Deficits. Long Term Goals: To be achieved in: 8 weeks  1. Disability index score of 20% or less for the DASH to assist with reaching prior level of function. 2. Patient will demonstrate increased left shoulder AROM to WNL to allow for proper joint functioning as indicated by patients Functional Deficits. 3. Patient will demonstrate an increase in Left shoulder strength to 5/5 to allow for proper functional mobility as indicated by patients Functional Deficits.    4. Patient will return to all functional/work related activities without increased symptoms or restriction. 5. Pt will lift >5lbs without difficulty. Progression Towards Functional goals:  [x] Patient is progressing as expected towards functional goals listed. [] Progression is slowed due to pain  [] Progression has been slowed due to co-morbidities. [] Plan just implemented, too soon to assess goals progression  [] Other:     ASSESSMENT:        Treatment/Activity Tolerance:  [x] Patient tolerated treatment well [x] Patient limited by fatique  [] Patient limited by pain  [] Patient limited by other medical complications  [] Other: end range stiffness with PROM flexion    Prognosis: [x] Good [] Fair  [] Poor    Patient Requires Follow-up: [x] Yes  [] No    PLAN:  Continue to ice shoulder and monitor activities around the house until stronger.  Progress strength as able  [x] Continue per plan of care [] Alter current plan (see comments)  [] Plan of care initiated [] Hold pending MD visit [] Discharge    Electronically signed by: Shana Ernandez PTA, [id number]

## 2017-11-24 ENCOUNTER — HOSPITAL ENCOUNTER (OUTPATIENT)
Dept: PHYSICAL THERAPY | Age: 62
Discharge: HOME | End: 2017-11-24
Admitting: ORTHOPAEDIC SURGERY

## 2017-11-24 NOTE — FLOWSHEET NOTE
[address], [facility]    Physical Therapy Daily Treatment Note  Date:  2017    Patient Name:  Sergey Valencia    :  1955  MRN: [id number]  Restrictions/Precautions:    Medical/Treatment Diagnosis Information:  · Diagnosis: M25.512 (Left shoulder pain)   · Treatment Diagnosis: M25.512 (Left shoulder pain)   Insurance/Certification information:  PT Insurance Information:   Physician Information:  Referring Practitioner: Dr. Devi Mendoza of care signed (Y/N):     Date of Patient follow up with Physician:  Berna Valles     G-Code (if applicable):      Date G-Code Applied:         Progress Note: [x]  Yes  []  No  Next due by: Visit #10      Latex Allergy:  [x]NO      []YES  Preferred Language for Healthcare:   [x]English       []other:    Visit # Insurance Allowable        Pain level:  4/10     SUBJECTIVE:  16 weeks s/p   Arm still having some stiff moments but has been busy and not much time to work on it. OBJECTIVE:  Observation:  Test measurements:    17  PROM Flex  ~165  ABD ~170  17  Prom with increased stiffness with Abduction and IR  10/10/17  TTP over the clavicle and slightly over the lateral shoulder, MMT ER demonstrated good RTC strength 4/5. PROM good ROM but had pain in all end ranges and especially attempting to perform IR ROM.    17 end range stiffness with PROM abduction  17 PROM has some end range stiffness with flexion    RESTRICTIONS/PRECAUTIONS: L SAD, DCE & debridement 17    Exercises/Interventions:   Therapeutic Ex  35' Wt / Resistance Sets/sec Reps Notes                  Pulley shoulder AAROM  5 mins  Cane AAROM flex/abduction/ER    [employer] press 5# 3 10            T- band Row/pinch    T-band high row    T-band extension     T-band IR  ER    T- band lower pinch       T- band ER activation       Supine SA punch 6#  x30    SL ER  SL ABD  3#  3# 3   10    Prone Rows/EXT  HABD    6# ROWS  3# 3   10 Decreased ROM with    Wall Slides Seated HH  Depression       No Money        Scapular ball rolls IR towel    Sleeper IR   30\" 3xea       LLLD ER hammock/ Doorway 3'               Forward diagonal stretch  HEP   Upper trap stretch  HEP     Manual Intervention  8'       Shld /GH Mobs  8 min     Post Cap mobs       Thoracic/Rib manipualtion       CT MT/Mobs       PROM MT                     NMR re-education  10'       Standing scaption  3# 2 10    GH depres/compress       Liz Scap Bio       Water stick  3# Flex  3x 15\"    Body blade ER/IR &   Flex  Black  Yellow        4x 30\"  3x 15\"  Flex yellow   Standing flex/ 90/90 ER/IR  HEP   Wall Ball bounce    D2 FLEX standing    Wall pushup        Therapeutic Exercise and NMR EXR  [x] (25043) Provided verbal/tactile cueing for activities related to strengthening, flexibility, endurance, ROM  for improvements in scapular, scapulothoracic and UE control with self care, reaching, carrying, lifting, house/yardwork, driving/computer work.    [] (64725) Provided verbal/tactile cueing for activities related to improving balance, coordination, kinesthetic sense, posture, motor skill, proprioception  to assist with  scapular, scapulothoracic and UE control with self care, reaching, carrying, lifting, house/yardwork, driving/computer work. Therapeutic Activities:    [] (73544 or 23834) Provided verbal/tactile cueing for activities related to improving balance, coordination, kinesthetic sense, posture, motor skill, proprioception and motor activation to allow for proper function of scapular, scapulothoracic and UE control with self care, carrying, lifting, driving/computer work.      Home Exercise Program:    [x] (77652) Reviewed/Progressed HEP activities related to strengthening, flexibility, endurance, ROM of scapular, scapulothoracic and UE control with self care, reaching, carrying, lifting, house/yardwork, driving/computer work  [] (23971) Reviewed/Progressed HEP activities related to improving balance, coordination, kinesthetic sense, posture, motor skill, proprioception of scapular, scapulothoracic and UE control with self care, reaching, carrying, lifting, house/yardwork, driving/computer work      Manual Treatments:  PROM / STM / Oscillations-Mobs:  G-I, II, III, IV (PA's, Inf., Post.)  [x] (61469) Provided manual therapy to mobilize soft tissue/joints of cervical/CT, scapular GHJ and UE for the purpose of modulating pain, promoting relaxation,  increasing ROM, reducing/eliminating soft tissue swelling/inflammation/restriction, improving soft tissue extensibility and allowing for proper ROM for normal function with self care, reaching, carrying, lifting, house/yardwork, driving/computer work    Modalities:     Charges:  Timed Code Treatment Minutes: 45 mins   Total Treatment Minutes: 45  mins     [] EVAL  [x] HO(95692) x  1   [] IONTO  [x] NMR (64490) x  1   [] VASO  [x] Manual (11994) x  1    [] Other:  [] TA x       [] Mech Traction (13940)  [] ES(attended) (88085)      [] ES (un) (22949):     GOALS:  Patient stated goal: \"return to work\"     Therapist goals for Patient:   Short Term Goals: To be achieved in: 2 weeks  1. Independent in HEP and progression per patient tolerance, in order to prevent re-injury. 2. Patient will have a decrease in pain to facilitate improvement in movement, function, and ADLs as indicated by Functional Deficits. Long Term Goals: To be achieved in: 8 weeks  1. Disability index score of 20% or less for the DASH to assist with reaching prior level of function. 2. Patient will demonstrate increased left shoulder AROM to WNL to allow for proper joint functioning as indicated by patients Functional Deficits. 3. Patient will demonstrate an increase in Left shoulder strength to 5/5 to allow for proper functional mobility as indicated by patients Functional Deficits.    4. Patient will return to all functional/work related activities without increased symptoms or restriction. 5. Pt will lift >5lbs without difficulty. Progression Towards Functional goals:  [x] Patient is progressing as expected towards functional goals listed. [] Progression is slowed due to pain  [] Progression has been slowed due to co-morbidities. [] Plan just implemented, too soon to assess goals progression  [] Other:     ASSESSMENT:        Treatment/Activity Tolerance:  [x] Patient tolerated treatment well [x] Patient limited by fatique  [] Patient limited by pain  [] Patient limited by other medical complications  [x] Other:  Improved ROM today with shoulder flexion Prom. Still has some pinching to the anterior shoulder with shoulder extension pres's had time constraints today so program limited. Prognosis: [x] Good [] Fair  [] Poor    Patient Requires Follow-up: [x] Yes  [] No    PLAN:  Continue to ice shoulder and monitor activities around the house until stronger.  Progress strength as able  [x] Continue per plan of care [] Alter current plan (see comments)  [] Plan of care initiated [] Hold pending MD visit [] Discharge    Electronically signed by: Rubi Hutton [facility], [id number]

## 2017-11-28 ENCOUNTER — HOSPITAL ENCOUNTER (OUTPATIENT)
Dept: PHYSICAL THERAPY | Age: 62
Discharge: HOME | End: 2017-11-28
Admitting: ORTHOPAEDIC SURGERY

## 2017-11-28 NOTE — FLOWSHEET NOTE
[address] , [facility]    Physical Therapy Daily Treatment Note  Date:  2017    Patient Name:  Sirena Carrillo    :  1955  MRN: [id number]  Restrictions/Precautions:    Medical/Treatment Diagnosis Information:  · Diagnosis: M25.512 (Left shoulder pain)   · Treatment Diagnosis: M25.512 (Left shoulder pain)   Insurance/Certification information:  PT Insurance Information:   Physician Information:  Referring Practitioner: Dr. Haque Chapel of care signed (Y/N):     Date of Patient follow up with Physician:  Jayce Serrano     G-Code (if applicable):      Date G-Code Applied:         Progress Note: [x]  Yes  []  No  Next due by: Visit #10      Latex Allergy:  [x]NO      []YES  Preferred Language for Healthcare:   [x]English       []other:    Visit # Insurance Allowable   15   11/28 20     Pain level:  4/10     SUBJECTIVE:  16 weeks s/p   Shoulder doing well, warmed it up this am.   OBJECTIVE:  Observation:  Test measurements:    17  PROM Flex  ~165  ABD ~170  17  Prom with increased stiffness with Abduction and IR  10/10/17  TTP over the clavicle and slightly over the lateral shoulder, MMT ER demonstrated good RTC strength /5. PROM good ROM but had pain in all end ranges and especially attempting to perform IR ROM.    17 end range stiffness with PROM abduction  17 PROM has some end range stiffness with flexion    RESTRICTIONS/PRECAUTIONS: L SAD, DCE & debridement 17    Exercises/Interventions:   Therapeutic Ex  35' Wt / Resistance Sets/sec Reps Notes                  Javi shoulder AAROM  Cane AAROM flex/abduction/ER    [employer] press 5# 3 10    CC ROW / LPD  On SB   CC BI/ TRI 50#   30#  3 10     T- band Row/pinch    T-band high row    T-band extension     T-band IR  ER    T- band lower pinch       T- band ER activation       Supine SA punch 7#  x30    SL ER  SL ABD  3#  3# 3   10    Prone Rows/EXT  HABD  / scaption 7# ROWS  3# 3   10 Decreased ROM with    Wall Slides HEPSeated HH  Depression       No Money Teal  1 25        Scapular ball rolls I   Sleeper IR     30\"   3xea       LLLD ER hammock/ Doorway 3'               Forward diagonal stretch  HEP   Upper trap stretch  HEP     Manual Intervention  8'       Shld /GH Mobs  8 min     Post Cap mobs       Thoracic/Rib manipualtion       CT MT/Mobs       PROM MT                     NMR re-education  10'       Standing scaption  3# 2 10    OH ball toss   CP  4#  6#   2 10    Wall walking up & down Teal  1 20x     Water stick  3# Flex/ABD  3x 15\"    Body blade ER/IR &    (90/90 ER/ IR)   Yellow         \"  3x 15\"     Standing flex/ 90/90 ER/IR  HEP   Wall Ball bounce    D2 FLEX standing    Wall pushup        Therapeutic Exercise and NMR EXR  [x] (55129) Provided verbal/tactile cueing for activities related to strengthening, flexibility, endurance, ROM  for improvements in scapular, scapulothoracic and UE control with self care, reaching, carrying, lifting, house/yardwork, driving/computer work.    [] (06797) Provided verbal/tactile cueing for activities related to improving balance, coordination, kinesthetic sense, posture, motor skill, proprioception  to assist with  scapular, scapulothoracic and UE control with self care, reaching, carrying, lifting, house/yardwork, driving/computer work. Therapeutic Activities:    [] (33825 or 11701) Provided verbal/tactile cueing for activities related to improving balance, coordination, kinesthetic sense, posture, motor skill, proprioception and motor activation to allow for proper function of scapular, scapulothoracic and UE control with self care, carrying, lifting, driving/computer work.      Home Exercise Program:    [x] (90298) Reviewed/Progressed HEP activities related to strengthening, flexibility, endurance, ROM of scapular, scapulothoracic and UE control with self care, reaching, carrying, lifting, house/yardwork, driving/computer work  [] (05448) Reviewed/Progressed HEP activities related to improving balance, coordination, kinesthetic sense, posture, motor skill, proprioception of scapular, scapulothoracic and UE control with self care, reaching, carrying, lifting, house/yardwork, driving/computer work      Manual Treatments:  PROM / STM / Oscillations-Mobs:  G-I, II, III, IV (PA's, Inf., Post.)  [x] (17090) Provided manual therapy to mobilize soft tissue/joints of cervical/CT, scapular GHJ and UE for the purpose of modulating pain, promoting relaxation,  increasing ROM, reducing/eliminating soft tissue swelling/inflammation/restriction, improving soft tissue extensibility and allowing for proper ROM for normal function with self care, reaching, carrying, lifting, house/yardwork, driving/computer work    Modalities:     Charges:  Timed Code Treatment Minutes: 55mins   Total Treatment Minutes: 55  mins     [] EVAL  [x] SY(92767) x  3   [] IONTO  [x] NMR (92144) x  1   [] VASO  [] Manual (06633) x       [] Other:  [] TA x       [] Mech Traction (79022)  [] ES(attended) (51035)      [] ES (un) (73759):     GOALS:  Patient stated goal: \"return to work\"     Therapist goals for Patient:   Short Term Goals: To be achieved in: 2 weeks  1. Independent in HEP and progression per patient tolerance, in order to prevent re-injury. 2. Patient will have a decrease in pain to facilitate improvement in movement, function, and ADLs as indicated by Functional Deficits. Long Term Goals: To be achieved in: 8 weeks  1. Disability index score of 20% or less for the DASH to assist with reaching prior level of function. 2. Patient will demonstrate increased left shoulder AROM to WNL to allow for proper joint functioning as indicated by patients Functional Deficits. 3. Patient will demonstrate an increase in Left shoulder strength to 5/5 to allow for proper functional mobility as indicated by patients Functional Deficits.    4. Patient will return to all functional/work related activities without increased symptoms or restriction. 5. Pt will lift >5lbs without difficulty. Progression Towards Functional goals:  [x] Patient is progressing as expected towards functional goals listed. [] Progression is slowed due to pain  [] Progression has been slowed due to co-morbidities. [] Plan just implemented, too soon to assess goals progression  [] Other:     ASSESSMENT:        Treatment/Activity Tolerance:  [x] Patient tolerated treatment well [x] Patient limited by fatique  [] Patient limited by pain  [] Patient limited by other medical complications  [x] Other: Shoulder performed well today with all exercises especially when in OH planes and when addressing endurance. RTC making good gains. Prognosis: [x] Good [] Fair  [] Poor    Patient Requires Follow-up: [x] Yes  [] No    PLAN:   Progress strength/endurance as able with plan to RTW .  Continue with HEP and therapy 1x week  [x] Continue per plan of care [] Alter current plan (see comments)  [] Plan of care initiated [] Hold pending MD visit [] Discharge    Electronically signed by: Dakota Chau [facility], [id number]

## 2017-12-01 ENCOUNTER — HOSPITAL ENCOUNTER (OUTPATIENT)
Dept: PHYSICAL THERAPY | Age: 62
Discharge: HOME | End: 2017-12-31
Attending: ORTHOPAEDIC SURGERY | Admitting: ORTHOPAEDIC SURGERY

## 2017-12-05 ENCOUNTER — HOSPITAL ENCOUNTER (OUTPATIENT)
Dept: PHYSICAL THERAPY | Age: 62
Discharge: HOME | End: 2017-12-05
Admitting: ORTHOPAEDIC SURGERY

## 2017-12-14 ENCOUNTER — OFFICE VISIT (OUTPATIENT)
Dept: ORTHOPEDIC SURGERY | Age: 62
End: 2017-12-14

## 2017-12-14 VITALS — BODY MASS INDEX: 37.77 KG/M2 | HEIGHT: 73 IN | WEIGHT: 285 LBS

## 2017-12-14 DIAGNOSIS — M19.019 ACROMIOCLAVICULAR JOINT ARTHRITIS: Primary | ICD-10-CM

## 2017-12-14 DIAGNOSIS — M75.42 SUBACROMIAL IMPINGEMENT, LEFT: ICD-10-CM

## 2017-12-14 PROCEDURE — 99213 OFFICE O/P EST LOW 20 MIN: CPT | Performed by: ORTHOPAEDIC SURGERY

## 2017-12-14 NOTE — LETTER
[facility]  [address] [facility] [address]  Phone: [phone]  Fax: [phone] [facility],[address] Msb, DO        December 14, 2017     Patient: Vera Vale   YOB: 1955   Date of Visit: 12/14/2017       To Whom It May Concern: It is my medical opinion that Broderick Dyer may return to work on 01/03/2017. If you have any questions or concerns, please don't hesitate to call.     Sincerely,         Aline Mike DO

## 2017-12-14 NOTE — LETTER
[facility]  [address] [facility] [address]  Phone: [phone]  Fax: [phone] [facility],[address] Msb, DO        December 14, 2017     Patient: Elizabeth Tao   YOB: 1955   Date of Visit: 12/14/2017       To Whom It May Concern: It is my medical opinion that Citlalli Ruiz may return to work on 01/03/2018. If you have any questions or concerns, please don't hesitate to call.     Sincerely,         Soheila Song, DO

## 2017-12-14 NOTE — PROGRESS NOTES
History of Present Illness: Recheck evaluation left shoulder surgery 7/14/17 doing extremely well back doing most activities finally getting his range of motion and strength back  Braxton Sotelo is a 65 y.o. male    Location left Shoulder  Severity starting to resolve  Duration more than 6 months  Associated sign/symptoms less pain and better motion finally turning the corner getting all the strength back    Medical History  Patient's medications, allergies, past medical, surgical, social and family histories were reviewed and updated as appropriate. Review of Systems  Pertinent items are noted in HPI  Review of systems reviewed from Patient History Form dated on 6/19/17 and available in the patient's chart under the Media tab. No change in his medical history form                                         Examination    General Exam:   Vitals: Height 6' 1\" (1.854 m), weight 285 lb (129.3 kg). Constitutional: Patient is adequately groomed with no evidence of malnutrition  Mental Status: The patient is oriented to time, place and person. The patient's mood and affect are appropriate. PHYSICAL EXAM:      Shoulder Examination  Inspection:  No swelling, no deformity, no erythema, no drainage, no signs of infection     Palpation:  Palpation reveals no effusion minimal pain, no warmth,     Range of Motion:  genital range of motion with no crepitus passive motion to 150° of forward flextion    Strength:  Intact strength with internal and external rotation along with  supraspinatus isolation bilaterally, Shoulder shrug is 5 over 5 , cervical spine strength is excellent, flexion extension at the elbow is 5 over 5 wrist and hand strength is equal bilaterally no winging no muscle atrophy.    Palpation:  Lateral deltoid no pain to palpation  AC joint mild pain to palopation  No pain Anterior to palpation  No pain Posterior to palpation  No trapezial pain to palpation  Range of Motion: Abduction --150 degrees  Flexion-- 180 degrees  Extension-- between 45-60 degrees  Latera/external  rotation --close to 90 degrees  Medial/ internal rotation -- between 70-90 degrees    Strength:  Left shoulder strength:   internal rotation against resistance is 4/5  external rotation against resistance is 4/5  and supraspinatus isolation against resistance is 4/5, Shoulder shrug is 5 over 5 , cervical spine strength is excellent, flexion extension at the elbow is 5 over 5 wrist and hand strength is equal bilaterally with supination pronation and flexion and extension  no winging no muscle atrophy. Special Tests: Palpation demonstrates no swelling no effusion no pain. There is full active and passive range of motion bilaterally. Strength is excellent with internal rotation against resistance external rotation against resistance supraspinatus isolation against resistance. Shoulder shrug strength is 5 over 5 equal bilaterally. Radial ulnar and median nerve function is intact. Capillary refill is brisk. Elbow motion finger and wrist motion is full equal bilaterally. Deep tendon reflexes of the Brachial radialis, biceps, tricepsAre all +2/4 equal bilaterally. Cervical spine range of motion is full without pain negative Spurling's test.  Load-and-shift test is negative. Crank test is negative. Apprehension and relocation is negative. Anterior and posterior glide are equal bilaterally. Negative sulcus sign. No signs of any significant multidirectional instability. There is no scapular winging. There is no muscle atrophy of the latissimus dorsi, the deltoid, the periscapular musculature,The trapezius musculature or the pectoralis musculature. Negative Neer's test, negative Cohn test, no pain with crossarm elevation. Contralateral Shoulder exam: Palpation demonstrates no swelling no effusion no pain. There is full active and passive range of motion bilaterally.   Strength is excellent with motion is unremarkable. There is no gross instability. There are no rashes, ulcerations or lesions. Strength and tone are normal.    Past Surgical History:   Procedure Laterality Date    CHOLECYSTECTOMY      HERNIA REPAIR      inguinal     KNEE ARTHROSCOPY Right 9/18/15    PARTIAL MEDIAL MENISCECTOMY,SYNOVECTOMY, CHONDROPLASTY    SHOULDER ARTHROSCOPY Left 07/14/2017    SUBACROMIAL DECOMPRESSION, DISTAL CLAVICLE EXCISION, LABRAL DEBRIDEMENT       Assessment:  Postop left shoulder subacromial decompression distal clavicle excision finally getting all of his strength and range of motion back doing very well    Impression: Same    Office Procedures:  No orders of the defined types were placed in this encounter. Previous Treatments: At this point he is very close to being done with physical therapy and strengthening    Treatment Plan:  I would like for him to start doing some work hardening in getting back to work at the 1st of the year and around January 3 or [address]. DIANA [facility] Trained  Board Certified  [employer] Team Physician

## 2017-12-15 ENCOUNTER — HOSPITAL ENCOUNTER (OUTPATIENT)
Dept: PHYSICAL THERAPY | Age: 62
Discharge: HOME | End: 2017-12-15
Admitting: ORTHOPAEDIC SURGERY

## 2017-12-15 ENCOUNTER — TELEPHONE (OUTPATIENT)
Dept: ORTHOPEDIC SURGERY | Age: 62
End: 2017-12-15

## 2017-12-15 NOTE — TELEPHONE ENCOUNTER
FAXED [id number] [facility] FROM 10/13/2017 TO PRESENT ( DR. Paulino Clayton ) TO [facility] @ [phone]

## 2017-12-15 NOTE — FLOWSHEET NOTE
[address], [facility]    Physical Therapy Daily Treatment Note  Date:  12/15/2017    Patient Name:  Toro Yañez    :  1955  MRN: [id number]  Restrictions/Precautions:    Medical/Treatment Diagnosis Information:  · Diagnosis: M25.512 (Left shoulder pain)   · Treatment Diagnosis: M25.512 (Left shoulder pain)   Insurance/Certification information:  PT Insurance Information:   Physician Information:  Referring Practitioner: Dr. Toya Bazan of care signed (Y/N):     Date of Patient follow up with Physician:  Shantel Shoulder     G-Code (if applicable):      Date G-Code Applied:         Progress Note: [x]  Yes  []  No  Next due by: Visit #10      Latex Allergy:  [x]NO      []YES  Preferred Language for Healthcare:   [x]English       []other:    Visit # Insurance Allowable   15   12/15 20     Pain level:  0-1/10  Stiffness     SUBJECTIVE:  16 weeks s/p  Millan yesterday and feels he is ok to RTW after the first of the year. Shoulder feels good overall just has some end range stiffness. Missed the last couple days due to illness. OBJECTIVE:  Observation:  Test measurements:    17  PROM Flex  ~165  ABD ~170  17  Prom with increased stiffness with Abduction and IR  10/10/17  TTP over the clavicle and slightly over the lateral shoulder, MMT ER demonstrated good RTC strength 4/5. PROM good ROM but had pain in all end ranges and especially attempting to perform IR ROM.    17 end range stiffness with PROM abduction  17 PROM has some end range stiffness with flexion  12/15/17 PROM    RESTRICTIONS/PRECAUTIONS: L SAD, DCE & debridement 17    Exercises/Interventions:   Therapeutic Ex  35' Wt / Resistance Sets/sec Reps Notes                  Pulley shoulder AAROM  5 mins  Cane AAROM flex/abduction/ER    Cane press 10# 3 15    CC ROWS/LPD   ER  40#  5# 1 25x      T- band Row/pinch    T-band high row    T-band extension     T-band IR  ER T- band lower pinch       T- band ER activation       Supine SA punch 8#  x30 ^ NPV   SL ER  SL ABD /punch 4#  5# 3  2   10    Prone Rows/EXT  HABD  Scaption    8#  3#   3   10    Wall Slides Seated HH  Depression       No Money Teal  1 25           Scapular ball rolls IR towel    Sleeper IR   30\" 3xea       LLLD ER hammock/ Doorway                Forward diagonal stretch  HEP   Upper trap stretch  HEP     Manual Intervention  8'       Shld /GH Mobs  8 min     Post Cap mobs       Thoracic/Rib manipualtion       CT MT/Mobs       PROM MT                     NMR re-education  10'       Standing scaption  3# 2 10    Ball toss OH   CP 4#  6#  20x     Liz Scap Bio       Water stick     Flex  3# ABD  3x 20\"    Body blade ER/IR &   Flex  Black  Yellow        3x 20-30\"  3x 20\"  Flex yellow   Supine flex/ 90/90 ER/IR/D2 flex yellow 3  10ea      Wall Ball bounce    D2 FLEX standing RED  3 10    Wall pushup  x30        Therapeutic Exercise and NMR EXR  [x] (02455) Provided verbal/tactile cueing for activities related to strengthening, flexibility, endurance, ROM  for improvements in scapular, scapulothoracic and UE control with self care, reaching, carrying, lifting, house/yardwork, driving/computer work.    [] (25417) Provided verbal/tactile cueing for activities related to improving balance, coordination, kinesthetic sense, posture, motor skill, proprioception  to assist with  scapular, scapulothoracic and UE control with self care, reaching, carrying, lifting, house/yardwork, driving/computer work. Therapeutic Activities:    [] (33412 or 71413) Provided verbal/tactile cueing for activities related to improving balance, coordination, kinesthetic sense, posture, motor skill, proprioception and motor activation to allow for proper function of scapular, scapulothoracic and UE control with self care, carrying, lifting, driving/computer work.      Home Exercise Program:    [x] (94137) Reviewed/Progressed HEP activities Patient will demonstrate an increase in Left shoulder strength to 5/5 to allow for proper functional mobility as indicated by patients Functional Deficits. 4. Patient will return to all functional/work related activities without increased symptoms or restriction. 5. Pt will lift >5lbs without difficulty. Progression Towards Functional goals:  [x] Patient is progressing as expected towards functional goals listed. [] Progression is slowed due to pain  [] Progression has been slowed due to co-morbidities. [] Plan just implemented, too soon to assess goals progression  [] Other:     ASSESSMENT:        Treatment/Activity Tolerance:  [x] Patient tolerated treatment well [x] Patient limited by fatique  [] Patient limited by pain  [] Patient limited by other medical complications  [] Other: end range stiffness with PROM flexion    Prognosis: [x] Good [] Fair  [] Poor    Patient Requires Follow-up: [x] Yes  [] No    PLAN:  Continue to ice shoulder and monitor activities around the house until stronger. Progress strength and maintain HEP as able.  1x week until RTW  [x] Continue per plan of care [] Alter current plan (see comments)  [] Plan of care initiated [] Hold pending MD visit [] Discharge   Electronically signed by: Kd Best PTA, [id number]

## 2017-12-18 ENCOUNTER — TELEPHONE (OUTPATIENT)
Dept: ORTHOPEDIC SURGERY | Age: 62
End: 2017-12-18

## 2017-12-22 ENCOUNTER — HOSPITAL ENCOUNTER (OUTPATIENT)
Dept: PHYSICAL THERAPY | Age: 62
Discharge: HOME | End: 2017-12-22
Admitting: ORTHOPAEDIC SURGERY

## 2017-12-22 NOTE — FLOWSHEET NOTE
punch 10#  x30 ^ NPV   SL ER  SL ABD /punch 5#  5# 3  2   10    Prone Rows/EXT  HABD  Scaption    10#  3#   3   10    Wall Slides Seated HH  Depression       No Money Wine  1 25           Scapular ball rolls IR towel    Sleeper IR   30\" 3xea       LLLD ER hammock/ Doorway                Forward diagonal stretch  HEP   Upper trap stretch  HEP     Manual Intervention  8'       Shld /GH Mobs  8 min     Post Cap mobs       Thoracic/Rib manipualtion       CT MT/Mobs       PROM MT                     NMR re-education  10'       Standing scaption  3# 2 10    Ball toss OH   CP 4#  6#  20x     Liz Scap Bio       Water stick     Flex  3# ABD  3x 20\"    Body blade ER/IR &   Flex  Black  Black       3x 20-30\"  3x 20\"  Flex yellow   Supine flex/ 90/90 ER/IR/D2 flex yellow 3  10ea      Wall Ball bounce    D2 FLEX standing RED  3 10    Wall pushup  x30        Therapeutic Exercise and NMR EXR  [x] (96542) Provided verbal/tactile cueing for activities related to strengthening, flexibility, endurance, ROM  for improvements in scapular, scapulothoracic and UE control with self care, reaching, carrying, lifting, house/yardwork, driving/computer work.    [] (38855) Provided verbal/tactile cueing for activities related to improving balance, coordination, kinesthetic sense, posture, motor skill, proprioception  to assist with  scapular, scapulothoracic and UE control with self care, reaching, carrying, lifting, house/yardwork, driving/computer work. Therapeutic Activities:    [] (04246 or 83129) Provided verbal/tactile cueing for activities related to improving balance, coordination, kinesthetic sense, posture, motor skill, proprioception and motor activation to allow for proper function of scapular, scapulothoracic and UE control with self care, carrying, lifting, driving/computer work.      Home Exercise Program:    [x] (53905) Reviewed/Progressed HEP activities related to strengthening, flexibility, endurance, ROM of scapular,

## 2017-12-29 ENCOUNTER — HOSPITAL ENCOUNTER (OUTPATIENT)
Dept: PHYSICAL THERAPY | Age: 62
Discharge: HOME | End: 2017-12-29
Admitting: ORTHOPAEDIC SURGERY

## 2017-12-29 NOTE — FLOWSHEET NOTE
[address], [facility]    Physical Therapy Daily Treatment Note  Date:  2017    Patient Name:  Ann Brice    :  1955  MRN: [id number]  Restrictions/Precautions:    Medical/Treatment Diagnosis Information:  · Diagnosis: M25.512 (Left shoulder pain)   · Treatment Diagnosis: M25.512 (Left shoulder pain)   Insurance/Certification information:  PT Insurance Information:   Physician Information:  Referring Practitioner: Dr. Katerina Aguirre of care signed (Y/N):     Date of Patient follow up with Physician:  Lokesh Ramos     G-Code (if applicable):      Date G-Code Applied:         Progress Note: [x]  Yes  []  No  Next due by: Visit #10      Latex Allergy:  [x]NO      []YES  Preferred Language for Healthcare:   [x]English       []other:    Visit # Insurance Allowable        Pain level: 1/10 more stiffness     SUBJECTIVE:  17 weeks s/p  Pt reports his shoulder continues to be stiff. Pt returns to work [address] [employer], pt reports no concerns with returning to work. OBJECTIVE:  Observation:  Test measurements:    17  PROM Flex  ~165  ABD ~170  17  Prom with increased stiffness with Abduction and IR  10/10/17  TTP over the clavicle and slightly over the lateral shoulder, MMT ER demonstrated good RTC strength 4/5. PROM good ROM but had pain in all end ranges and especially attempting to perform IR ROM.    17 end range stiffness with PROM abduction  17 PROM has some end range stiffness with flexion  12/15/17 PROM    RESTRICTIONS/PRECAUTIONS: L SAD, DCE & debridement 17    Exercises/Interventions:   Therapeutic Ex  35' Wt / Resistance Sets/sec Reps Notes   Pulley shoulder AAROM  5 mins  Cane AAROM flex/abduction/ER    Cane press 10# 3 15    CC ROWS/LPD   ER  40#  5# 1 25x             Supine SA punch 10#  x30    SL ER  SL ABD /punch 5#  5# 3  2   10    Prone Rows/EXT  HABD  Scaption    10#  3#   3   10    Wall Slides Seated HH  Depression       No Money Blue  1 25           Scapular ball rolls IR towel    Sleeper IR   30\" 3xea       LLLD ER hammock/ Doorway                Forward diagonal stretch  HEP   Upper trap stretch  HEP     Manual Intervention  8'       Shld /GH Mobs  8 min     Post Cap mobs       Thoracic/Rib manipualtion       CT MT/Mobs       PROM MT                     NMR re-education  10'       Standing scaption  3# 2 10    Ball toss OH   CP  chop 4#  6#  4#  20x     Liz Scap Bio       Water stick     Flex  3# ABD  3x 20\"    Body blade ER/IR &   Flex  Black  Black       3x 20-30\"  3x 20\"  Flex yellow   Supine flex/ 90/90 ER/IR/D2 flex RED 3  10ea      Wall Ball bounce    D2 FLEX standing RED  3 10    Wall pushup  x30        Therapeutic Exercise and NMR EXR  [x] (36154) Provided verbal/tactile cueing for activities related to strengthening, flexibility, endurance, ROM  for improvements in scapular, scapulothoracic and UE control with self care, reaching, carrying, lifting, house/yardwork, driving/computer work.    [] (72918) Provided verbal/tactile cueing for activities related to improving balance, coordination, kinesthetic sense, posture, motor skill, proprioception  to assist with  scapular, scapulothoracic and UE control with self care, reaching, carrying, lifting, house/yardwork, driving/computer work. Therapeutic Activities:    [] (77737 or 48525) Provided verbal/tactile cueing for activities related to improving balance, coordination, kinesthetic sense, posture, motor skill, proprioception and motor activation to allow for proper function of scapular, scapulothoracic and UE control with self care, carrying, lifting, driving/computer work.      Home Exercise Program:    [x] (19929) Reviewed/Progressed HEP activities related to strengthening, flexibility, endurance, ROM of scapular, scapulothoracic and UE control with self care, reaching, carrying, lifting, house/yardwork, driving/computer work  [] (67008) functional/work related activities without increased symptoms or restriction. 5. Pt will lift >5lbs without difficulty. Progression Towards Functional goals:  [x] Patient is progressing as expected towards functional goals listed. [] Progression is slowed due to pain  [] Progression has been slowed due to co-morbidities. [] Plan just implemented, too soon to assess goals progression  [] Other:     ASSESSMENT:   Pt reports significant fatigue with shoulder soreness following treatment session. Treatment/Activity Tolerance:  [x] Patient tolerated treatment well [x] Patient limited by fatique  [] Patient limited by pain  [] Patient limited by other medical complications  [] Other: end range stiffness with PROM flexion    Prognosis: [x] Good [] Fair  [] Poor    Patient Requires Follow-up: [x] Yes  [] No    PLAN:  Pt to return to work Jan 3, pt to schedule 1x following return to work. [x] Continue per plan of care [] Alter current plan (see comments)  [] Plan of care initiated [] Hold pending MD visit [] Discharge   Electronically signed by:  Lakshmi Ferro PT DPT

## 2018-01-01 ENCOUNTER — HOSPITAL ENCOUNTER (OUTPATIENT)
Dept: PHYSICAL THERAPY | Age: 63
Discharge: HOME | End: 2018-01-31
Attending: ORTHOPAEDIC SURGERY | Admitting: ORTHOPAEDIC SURGERY

## 2018-04-19 ENCOUNTER — OFFICE VISIT (OUTPATIENT)
Dept: ORTHOPEDIC SURGERY | Age: 63
End: 2018-04-19

## 2018-04-19 VITALS — BODY MASS INDEX: 37.78 KG/M2 | HEIGHT: 73 IN | WEIGHT: 285.06 LBS

## 2018-04-19 DIAGNOSIS — M19.019 ACROMIOCLAVICULAR JOINT ARTHRITIS: ICD-10-CM

## 2018-04-19 DIAGNOSIS — M75.42 SUBACROMIAL IMPINGEMENT, LEFT: Primary | ICD-10-CM

## 2018-04-19 PROCEDURE — 99213 OFFICE O/P EST LOW 20 MIN: CPT | Performed by: ORTHOPAEDIC SURGERY

## 2019-10-03 ENCOUNTER — OFFICE VISIT (OUTPATIENT)
Dept: ORTHOPEDIC SURGERY | Age: 64
End: 2019-10-03
Payer: COMMERCIAL

## 2019-10-03 VITALS — BODY MASS INDEX: 37.78 KG/M2 | HEIGHT: 73 IN | WEIGHT: 285.06 LBS

## 2019-10-03 DIAGNOSIS — M17.11 PRIMARY OSTEOARTHRITIS OF RIGHT KNEE: ICD-10-CM

## 2019-10-03 DIAGNOSIS — M17.10 ARTHRITIS OF KNEE: Primary | ICD-10-CM

## 2019-10-03 PROCEDURE — 99214 OFFICE O/P EST MOD 30 MIN: CPT | Performed by: ORTHOPAEDIC SURGERY

## 2019-10-03 RX ORDER — PREDNISONE 10 MG/1
TABLET ORAL
Qty: 30 TABLET | Refills: 0 | Status: SHIPPED | OUTPATIENT
Start: 2019-10-03

## 2019-11-04 ENCOUNTER — TELEPHONE (OUTPATIENT)
Dept: ORTHOPEDIC SURGERY | Age: 64
End: 2019-11-04

## 2019-12-05 ENCOUNTER — OFFICE VISIT (OUTPATIENT)
Dept: ORTHOPEDIC SURGERY | Age: 64
End: 2019-12-05
Payer: COMMERCIAL

## 2019-12-05 VITALS — HEIGHT: 73 IN | BODY MASS INDEX: 37.78 KG/M2 | WEIGHT: 285.06 LBS

## 2019-12-05 DIAGNOSIS — M17.11 PRIMARY OSTEOARTHRITIS OF RIGHT KNEE: Primary | ICD-10-CM

## 2019-12-05 PROCEDURE — 99214 OFFICE O/P EST MOD 30 MIN: CPT | Performed by: ORTHOPAEDIC SURGERY

## 2019-12-05 PROCEDURE — 20610 DRAIN/INJ JOINT/BURSA W/O US: CPT | Performed by: ORTHOPAEDIC SURGERY

## 2019-12-05 RX ORDER — BETAMETHASONE SODIUM PHOSPHATE AND BETAMETHASONE ACETATE 3; 3 MG/ML; MG/ML
6 INJECTION, SUSPENSION INTRA-ARTICULAR; INTRALESIONAL; INTRAMUSCULAR; SOFT TISSUE ONCE
Status: COMPLETED | OUTPATIENT
Start: 2019-12-05 | End: 2019-12-05

## 2019-12-05 RX ADMIN — BETAMETHASONE SODIUM PHOSPHATE AND BETAMETHASONE ACETATE 6 MG: 3; 3 INJECTION, SUSPENSION INTRA-ARTICULAR; INTRALESIONAL; INTRAMUSCULAR; SOFT TISSUE at 14:07

## 2020-06-11 ENCOUNTER — OFFICE VISIT (OUTPATIENT)
Dept: ORTHOPEDIC SURGERY | Age: 65
End: 2020-06-11
Payer: COMMERCIAL

## 2020-06-11 VITALS — TEMPERATURE: 99 F | HEIGHT: 73 IN | BODY MASS INDEX: 37.78 KG/M2 | WEIGHT: 285.06 LBS

## 2020-06-11 PROCEDURE — 99214 OFFICE O/P EST MOD 30 MIN: CPT | Performed by: ORTHOPAEDIC SURGERY

## 2020-06-11 RX ORDER — CELECOXIB 200 MG/1
200 CAPSULE ORAL DAILY
Qty: 30 CAPSULE | Refills: 2 | Status: SHIPPED | OUTPATIENT
Start: 2020-06-11 | End: 2020-09-28

## 2020-06-11 NOTE — PROGRESS NOTES
Examinations:  Right Upper Extremity:  Examination of the right upper extremity does not show any tenderness, deformity or injury. Range of motion is unremarkable. There is no gross instability. There are no rashes, ulcerations or lesions. Strength and tone are normal.  Left Upper Extremity: Examination of the left upper extremity does not show any tenderness, deformity or injury. Range of motion is unremarkable. There is no gross instability. There are no rashes, ulcerations or lesions. Strength and tone are normal.  Lower Back: Examination of the lower back does not show any tenderness, deformity or injury. Range of motion is unremarkable. There is no gross instability. There are no rashes, ulcerations or lesions. Strength and tone are normal.    Past Surgical History:   Procedure Laterality Date    CHOLECYSTECTOMY      HERNIA REPAIR      inguinal     KNEE ARTHROSCOPY Right 9/18/15    PARTIAL MEDIAL MENISCECTOMY,SYNOVECTOMY, CHONDROPLASTY    SHOULDER ARTHROSCOPY Left 07/14/2017    SUBACROMIAL DECOMPRESSION, DISTAL CLAVICLE EXCISION, LABRAL DEBRIDEMENT   No results found. Impression: Osteoarthritis right knee    Office Procedures:  No orders of the defined types were placed in this encounter. Differential Diagnoses: Osteoarthritis osteoarthritis right knee, infection, contusion, knee pathology, Muscle injury, bone tumor or stress fracture. Possible other diagnoses: Osteoarthritis knee same as a differential diagnosis      Plan (Medical Decision Making):    I discussed the diagnosis and the treatment options with Ann Brice today. In Summary:  The various treatment options were outlined and discussed with Ann Brice including:  Conservative care options: physical therapy, ice, medications, bracing, and activity modification. The indications for therapeutic injections. The indications for additional imaging/laboratory studies.   The indications for (possible future) interventions. After considering the various options discussed, Michelle Henry elected to pursue a course of treatment that includes the followin. Medications: No further recommendations for new medications. 2. PT:  Prescribed home exercise program.    3. Further studies: No further studies. 4. Interventional:  \"After further imaging is obtained, interventional options will be reviewed and recommended. Radiologic imaging and symptoms confirm the pain etiology. Risks, benefits and alternatives of interventional options were discussed. These include and are not limited to bleeding, infection, spinal headache, nerve injury, increased pain and lack of pain relief. The patient verbalized understanding and would like to proceed. The patient will be scheduled accordingly    5. Healthy Lifestyle Measures:  Patient education material reviewing the following was distributed to Michelle Henry  Anatomic drawings  Healthy lifestyle education  Advanced imaging preparedness    Proper lifting and carrying techniques,   Weight management discussed  Quitting smoking      6. Follow up: When he has increased discomfort he is going to call us for another [id number] [employer]      Michelle Henry was instructed to call the office if his symptoms worsen or if new symptoms appear prior to the next scheduled visit. He is specifically instructed to contact the office between now & his scheduled appointment if he has concerns related to his condition or if he needs assistance in scheduling the above tests. He is   welcome to call for an appointment sooner if he has any additional concerns or questions. Arpita Hassan DO    [facility]  Sports Fellowship Trained  Board Certified  [employer] Team Physician      Disclaimer: \"This note was dictated with voice recognition software. Though review and correction are routine, we apologize for any errors. \"

## 2020-09-28 RX ORDER — CELECOXIB 200 MG/1
CAPSULE ORAL
Qty: 30 CAPSULE | Refills: 1 | Status: SHIPPED | OUTPATIENT
Start: 2020-09-28